# Patient Record
Sex: MALE | NOT HISPANIC OR LATINO | URBAN - METROPOLITAN AREA
[De-identification: names, ages, dates, MRNs, and addresses within clinical notes are randomized per-mention and may not be internally consistent; named-entity substitution may affect disease eponyms.]

---

## 2019-08-07 VITALS
SYSTOLIC BLOOD PRESSURE: 122 MMHG | HEIGHT: 76 IN | DIASTOLIC BLOOD PRESSURE: 74 MMHG | HEART RATE: 66 BPM | OXYGEN SATURATION: 97 % | WEIGHT: 315 LBS | TEMPERATURE: 97 F | RESPIRATION RATE: 18 BRPM

## 2019-08-07 NOTE — ASU PATIENT PROFILE, ADULT - PMH
No pertinent past medical history <<----- Click to add NO pertinent Past Medical History Ventral hernia

## 2019-08-08 ENCOUNTER — OUTPATIENT (OUTPATIENT)
Dept: OUTPATIENT SERVICES | Facility: HOSPITAL | Age: 34
LOS: 1 days | Discharge: HOME | End: 2019-08-08
Payer: COMMERCIAL

## 2019-08-08 VITALS — RESPIRATION RATE: 16 BRPM | HEART RATE: 78 BPM | OXYGEN SATURATION: 96 %

## 2019-08-08 DIAGNOSIS — K43.9 VENTRAL HERNIA WITHOUT OBSTRUCTION OR GANGRENE: ICD-10-CM

## 2019-08-08 PROCEDURE — 88302 TISSUE EXAM BY PATHOLOGIST: CPT

## 2019-08-08 PROCEDURE — 49568: CPT

## 2019-08-08 PROCEDURE — 88305 TISSUE EXAM BY PATHOLOGIST: CPT

## 2019-08-08 PROCEDURE — C1781: CPT

## 2019-08-08 PROCEDURE — 49561: CPT

## 2019-08-08 RX ORDER — OXYCODONE AND ACETAMINOPHEN 5; 325 MG/1; MG/1
1 TABLET ORAL EVERY 6 HOURS
Refills: 0 | Status: DISCONTINUED | OUTPATIENT
Start: 2019-08-08 | End: 2019-08-08

## 2019-08-08 RX ORDER — HEPARIN SODIUM 5000 [USP'U]/ML
7500 INJECTION INTRAVENOUS; SUBCUTANEOUS EVERY 8 HOURS
Refills: 0 | Status: DISCONTINUED | OUTPATIENT
Start: 2019-08-08 | End: 2019-08-08

## 2019-08-08 RX ORDER — BUPIVACAINE 13.3 MG/ML
20 INJECTION, SUSPENSION, LIPOSOMAL INFILTRATION ONCE
Refills: 0 | Status: DISCONTINUED | OUTPATIENT
Start: 2019-08-08 | End: 2019-08-08

## 2019-08-08 RX ORDER — HEPARIN SODIUM 5000 [USP'U]/ML
5000 INJECTION INTRAVENOUS; SUBCUTANEOUS EVERY 8 HOURS
Refills: 0 | Status: DISCONTINUED | OUTPATIENT
Start: 2019-08-08 | End: 2019-08-08

## 2019-08-08 RX ORDER — ONDANSETRON 8 MG/1
4 TABLET, FILM COATED ORAL EVERY 6 HOURS
Refills: 0 | Status: DISCONTINUED | OUTPATIENT
Start: 2019-08-08 | End: 2019-08-08

## 2019-08-08 RX ORDER — SODIUM CHLORIDE 9 MG/ML
1000 INJECTION, SOLUTION INTRAVENOUS
Refills: 0 | Status: DISCONTINUED | OUTPATIENT
Start: 2019-08-08 | End: 2019-08-08

## 2019-08-08 RX ORDER — OXYCODONE AND ACETAMINOPHEN 5; 325 MG/1; MG/1
2 TABLET ORAL EVERY 6 HOURS
Refills: 0 | Status: DISCONTINUED | OUTPATIENT
Start: 2019-08-08 | End: 2019-08-08

## 2019-08-08 RX ADMIN — SODIUM CHLORIDE 100 MILLILITER(S): 9 INJECTION, SOLUTION INTRAVENOUS at 09:40

## 2019-08-08 NOTE — H&P ADULT - HISTORY OF PRESENT ILLNESS
34M hx MO with possible CATERINA, no PSH here with large incarcerated ventral/umbilical hernia. Pt reports that it has been enlarging recently and causing him some pain. No f/c/n/v. No change in bowel habits.

## 2019-08-08 NOTE — H&P ADULT - NSHPPHYSICALEXAM_GEN_ALL_CORE
Gen: NAD  CV: RRR  Pulm: regular nonlabored respirations  Abd: Obese, NT. Large, nonreducible umbilical hernia noted

## 2019-08-08 NOTE — PROGRESS NOTE ADULT - SUBJECTIVE AND OBJECTIVE BOX
Procedure: Open ventral hernia repair  Surgeon: Mario    S: Pt has no complaints. Denies N/V, CP, SOB, ORTIZ, calf tenderness. Pain controlled with medication. Patient is voiding, pain is controlled.     O:  T(C): 36.8 (08-08-19 @ 11:00), Max: 36.8 (08-08-19 @ 11:00)  T(F): 98.2 (08-08-19 @ 11:00), Max: 98.2 (08-08-19 @ 11:00)  HR: 74 (08-08-19 @ 11:13) (74 - 96)  BP: 141/68 (08-08-19 @ 11:00) (128/70 - 146/69)  RR: 15 (08-08-19 @ 11:13) (12 - 20)  SpO2: 95% (08-08-19 @ 11:13) (95% - 98%)  Wt(kg): --            Gen: NAD, resting comfortably in bed  C/V: NSR  Pulm: Nonlabored breathing, no respiratory distress  Abd: soft, NT/ND Incision:  Extrem: WWP, no calf edema, SCDs in place      A/P: 34yMale s/p open ventral hernia repair  -Per attending, due to possible history of CATERINA, pt needs to be signed off by anesthesia  -DC home

## 2019-08-08 NOTE — BRIEF OPERATIVE NOTE - OPERATION/FINDINGS
Preop Dx: Ventral hernia  Postop Dx: Same as above  Procedure: Open ventral hernia repair with mesh  Findings: Periumbilical incision performed. Dissection down to fascia performed. Hernia Sac opened and large amount of viable omentum noted. Fascial opening extended to accommodate the omentum. Partial omentectomy performed. Ventralex ST mesh placed and secured with Novafil sutures. Fascia closed over mesh. Incision irrigated with antibiotic solution. Incision closed in layers.

## 2019-08-17 LAB — SURGICAL PATHOLOGY STUDY: SIGNIFICANT CHANGE UP
